# Patient Record
Sex: MALE | URBAN - METROPOLITAN AREA
[De-identification: names, ages, dates, MRNs, and addresses within clinical notes are randomized per-mention and may not be internally consistent; named-entity substitution may affect disease eponyms.]

---

## 2018-02-22 NOTE — PATIENT DISCUSSION
(H25.13) Age-related nuclear cataract, bilateral - Assesment : Examination revealed cataract. RGP wearer. - Plan : Discussed option of CE to improve visual function. Pt may like to consider proceeding with CE when returns in the Fall. Updated GLRx given. RTC in Fall for Exam (eval NS), sooner if problems or changes.

## 2018-02-22 NOTE — PATIENT DISCUSSION
(Y89.634) Vitreous degeneration, bilateral - Assesment : Examination revealed PVD floaters OU. No changes reported. No holes or tears noted. - Plan : Monitor for changes. Advised patient to call our office with decreased vision or an increase in flashes and/or floaters.

## 2018-02-28 ENCOUNTER — IMPORTED ENCOUNTER (OUTPATIENT)
Dept: URBAN - METROPOLITAN AREA CLINIC 9 | Facility: CLINIC | Age: 33
End: 2018-02-28

## 2018-06-27 NOTE — PATIENT DISCUSSION
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  - Plan : See plan # 1

## 2018-06-27 NOTE — PATIENT DISCUSSION
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  - Plan : See plan # 1

## 2018-06-27 NOTE — PATIENT DISCUSSION
(H45.443) Vitreous degeneration, right eye - Assesment : Examination revealed a posterior vitreous detachment OU. - Plan : Handouts given on posterior vitreous detachment and risk factors discussed for retinal detachment development. Advised to call immediately with any changes.

## 2018-06-27 NOTE — PATIENT DISCUSSION
(H25.13) Age-related nuclear cataract, bilateral - Assesment : Examination reveals moderate cataract, patient is symptomatic with visual function affected. patient has had Monovision with contacts in the past and was happy with it. Discussed lens options - Plan : Risks, Benefits and Alternatives were discussed with patient at length for Cataract Surgery. Visual symptoms are consistent with Cataract findings on examination and current refraction no longer provides satisfactory vision. Monovision with IOL's was explained to the patient as a reasonable option for patients who have experienced Monovision either naturally or with contact lenses. When significant corneal astigmatism is present a toric IOL may be necessary to maximize vision quality. Intraoperative aberrometry is usually necessary to reduce the chance for refractive surprise that may require possible lens exchange or refractive surgery correction. Glasses or contacts may still be necessary for distance or near vision. Patient has had Monovision with contact lens in the past and liked it. Patient understands and desires surgery. All questions answered. Risks, Benefits and Alternatives discussed at length for IOL placement. Patient will need to wear glasses for best corrected distance  and near. EYE:OS IOL TYPE:Toric / Monovision-Near POST OPERATIVE TARGET: -1.75  RECOMMENDED PACKAGE: Toric Pkg PT PREFERRED PACKAGE:  Toric Pkg OD to follow  Patient to see surgery counselor today.

## 2018-06-27 NOTE — PATIENT DISCUSSION
(H11.151) Pinguecula, right eye - Assesment : Examination revealed Pinguecula OD. - Plan : Monitor for changes. Advised patient to call our office with decreased vision or increased symptoms.

## 2018-08-02 NOTE — PATIENT DISCUSSION
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 ORA done in the OR - Plan : Discussed signs and symptoms of infection and retinal detachments. Do not rub operated eye. Follow drop schedule If redness,pain,decreased vision, flashes or floaters occur then contact clinic.   RTC 1 week follow up

## 2018-08-02 NOTE — PATIENT DISCUSSION
(H25.13) Age-related nuclear cataract, bilateral - Assesment : Examination reveals moderate cataract, patient is symptomatic with visual function affected. patient has had Monovision with contacts in the past and was happy with it. Discussed lens options - Plan : Risks, Benefits and Alternatives were discussed with patient at length for Cataract Surgery. Visual symptoms are consistent with Cataract findings on examination and current refraction no longer provides satisfactory vision. Monovision with IOL's was explained to the patient as a reasonable option for patients who have experienced Monovision either naturally or with contact lenses. When significant corneal astigmatism is present a toric IOL may be necessary to maximize vision quality. Intraoperative aberrometry is usually necessary to reduce the chance for refractive surprise that may require possible lens exchange or refractive surgery correction. Glasses or contacts may still be necessary for distance or near vision. Patient has had Monovision with contact lens in the past and liked it. Patient understands and desires surgery. All questions answered. Risks, Benefits and Alternatives discussed at length for IOL placement. Patient will need to wear glasses for best corrected distance  and near. EYE:OD IOL TYPE:Toric / Monovision-Near POST OPERATIVE TARGET: -1.75  RECOMMENDED PACKAGE: Toric Pkg PT PREFERRED PACKAGE:  Toric Pkg  Patient to see surgery counselor today.

## 2018-08-08 NOTE — PATIENT DISCUSSION
(H25.11) Age-related nuclear cataract, right eye - Assesment : Examination reveals moderate cataract, patient is symptomatic with visual function affected. Patient has had Monovision with contacts in the past and was happy with it. Discussed lens options - Plan : Risks, Benefits and Alternatives were discussed with patient at length for Cataract Surgery. Visual symptoms are consistent with Cataract findings on examination and current refraction no longer provides satisfactory vision. Monovision with IOL's was explained to the patient as a reasonable option for patients who have experienced Monovision either naturally or with contact lenses. When significant corneal astigmatism is present a toric IOL may be necessary to maximize vision quality. Intraoperative aberrometry is usually necessary to reduce the chance for refractive surprise that may require possible lens exchange or refractive surgery correction. Glasses or contacts may still be necessary for distance or near vision. Patient has had Monovision with contact lens in the past and liked it. Patient understands and desires surgery. All questions answered. Risks, Benefits and Alternatives discussed at length for IOL placement. Patient will need to wear glasses for best corrected distance  and near. EYE:OD IOL TYPE:Toric / Monovision-distance POST OPERATIVE TARGET: Avni MORRIS RECOMMENDED PACKAGE: Toric Pkg PT PREFERRED PACKAGE:  Toric Pkg  Patient to see surgery counselor today.

## 2018-08-16 NOTE — PATIENT DISCUSSION
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 ORA was done in OR on operated eye. - Plan : Discussed signs and symptoms of infection and retinal detachments. Do not rub operated eye. Follow drop schedule If redness,pain,decreased vision, flashes or floaters occur then contact clinic.   RTC 1 week follow  up/ AR OU

## 2018-09-20 NOTE — PATIENT DISCUSSION
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 Monovison IOLs, patient not totally happy with monovision. - Plan : Recommend artificial tears 3-4 times daily OU to try to improve surface dryness. Advised with practice, vision should improve and she should continue to adapt the more she goes without glasses. Discussed that she can use rx glasses when eyes feel fatigued for more simultaneous vision. Signs and symptoms of infection and retinal detachment are outlined in your surgical packet. Do not rub operated eye. If redness, pain, decreased vision, flashes or floaters occur then contact clinic. RV 3-4 weeks follow up.

## 2018-10-11 NOTE — PATIENT DISCUSSION
(I56.856) Other secondary cataract, bilateral - Assesment : Posterior capsule opacification present. Patient is currently asymptomatic and functioning well. - Plan : Monitor for Changes. Advised patient to call our office with decreased vision or increased symptoms.

## 2018-10-11 NOTE — PATIENT DISCUSSION
Recommend wearing glasses for extended reading for comfort and continue artificial tears 3-4 times daily OU. Advised that at this point do not recommend IOL exchange or any other surgical intervention. Advised that with monovision having both eyes able to see at one distance gives the brain a better simultaneous image. Signs and symptoms of infection and retinal detachment are outlined in your surgical packet. Do not rub operated eye. If redness, pain, decreased vision, flashes or floaters occur then contact clinic.

## 2018-10-11 NOTE — PATIENT DISCUSSION
Monitor for Changes.  Advised patient to call our office with decreased vision or increased symptoms.

## 2018-10-11 NOTE — PATIENT DISCUSSION
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 Monovison IOLs. Patient is able to read without glasses but eyes get tired quickly. - Plan : Recommend wearing glasses for extended reading for comfort and continue artificial tears 3-4 times daily OU. Advised that at this point do not recommend IOL exchange or any other surgical intervention. Advised that with monovision having both eyes able to see at one distance gives the brain a better simultaneous image. Signs and symptoms of infection and retinal detachment are outlined in your surgical packet. Do not rub operated eye. If redness, pain, decreased vision, flashes or floaters occur then contact clinic.  RV 1 year Exam.

## 2021-05-24 ENCOUNTER — IMPORTED ENCOUNTER (OUTPATIENT)
Dept: URBAN - METROPOLITAN AREA CLINIC 9 | Facility: CLINIC | Age: 36
End: 2021-05-24

## 2021-10-16 ASSESSMENT — TONOMETRY
OS_IOP_MMHG: 11
OD_IOP_MMHG: 13
OS_IOP_MMHG: 14
OD_IOP_MMHG: 14

## 2021-10-16 ASSESSMENT — VISUAL ACUITY
OS_SC: 20/30 - SN
OS_SC: 20/20 + SN
OD_CC: 20/20 - SN
OD_CC: 20/20 SN
OD_SC: 20/50 -2 SN
OS_CC: 20/25 SN
OS_CC: 20/20 SN
OD_SC: 20/40 SN
OD_CC: 20/25 SN
